# Patient Record
Sex: MALE | Race: WHITE | URBAN - METROPOLITAN AREA
[De-identification: names, ages, dates, MRNs, and addresses within clinical notes are randomized per-mention and may not be internally consistent; named-entity substitution may affect disease eponyms.]

---

## 2019-03-07 ENCOUNTER — APPOINTMENT (RX ONLY)
Dept: URBAN - METROPOLITAN AREA CLINIC 282 | Facility: CLINIC | Age: 84
Setting detail: DERMATOLOGY
End: 2019-03-07

## 2019-03-07 DIAGNOSIS — L71.8 OTHER ROSACEA: ICD-10-CM

## 2019-03-07 PROBLEM — I10 ESSENTIAL (PRIMARY) HYPERTENSION: Status: ACTIVE | Noted: 2019-03-07

## 2019-03-07 PROBLEM — I48.91 UNSPECIFIED ATRIAL FIBRILLATION: Status: ACTIVE | Noted: 2019-03-07

## 2019-03-07 PROBLEM — H91.90 UNSPECIFIED HEARING LOSS, UNSPECIFIED EAR: Status: ACTIVE | Noted: 2019-03-07

## 2019-03-07 PROCEDURE — 99212 OFFICE O/P EST SF 10 MIN: CPT

## 2019-03-07 PROCEDURE — ? PRESCRIPTION

## 2019-03-07 PROCEDURE — ? COUNSELING

## 2019-03-07 RX ORDER — MINOCYCLINE HYDROCHLORIDE 50 MG/1
CAPSULE ORAL
Qty: 30 | Refills: 11 | Status: ERX | COMMUNITY
Start: 2019-03-07

## 2019-03-07 RX ADMIN — MINOCYCLINE HYDROCHLORIDE: 50 CAPSULE ORAL at 00:00

## 2019-03-07 ASSESSMENT — LOCATION SIMPLE DESCRIPTION DERM: LOCATION SIMPLE: LEFT CHEEK

## 2019-03-07 ASSESSMENT — LOCATION DETAILED DESCRIPTION DERM: LOCATION DETAILED: LEFT MEDIAL MALAR CHEEK

## 2019-03-07 ASSESSMENT — LOCATION ZONE DERM: LOCATION ZONE: FACE

## 2022-10-04 NOTE — HPI: OTHER
[address]                 [address] [facility], [address]                          ELECTROENCEPHALOGRAM REPORT    PATIENT NAME: Estevan Macario                      :        1944  MED REC NO:   [id number]                              ROOM:       2069  ACCOUNT NO:   [de-identified]                           ADMIT DATE: 10/03/2022  PROVIDER:     Poonam Sandoval    DATE OF EEG:  10/04/2022    HISTORY:  This is a 70-year-old male with progressive memory loss and  with behavioral changes with increased activation and agitation. He  also has worsening spells of confusion. He is being evaluated for  possible seizures. MEDICATIONS:  Include galantamine, allopurinol, carvedilol,  spironolactone, and melatonin. DESCRIPTION OF THE PROCEDURE; Electrodes were applied using paste in  positions dictated by the international 10-20 system of placement. Reviewing montages included both referential and bipolar derivations. In addition to EEG data, EKG and eye movements were recorded. This is a  routine recording. This test was performed on 10/04/2022. DESCRIPTION OF ACTIVITIES:  At the onset of the study, the patient is  awake and during wakefulness, there are continuous runs of diffusely  slow background in delta frequencies at 3-4 Hz activity with  superimposed low amplitude high frequency beta activity. Blink  artifacts are noted occurring symmetrically. Throughout the study, the  patient is awake and sleep is not recorded. The low amplitude high  frequency beta activity noted occurring in bilateral anterior head  regions along with frequent muscle artifacts and these attenuated at  times. Photic stimulation did not induce posterior driving responses. Hyperventilation is not performed. Electrocardiogram montage revealed  artifacts. This study did not demonstrate any ongoing electrographic  seizure activity.   No epileptiform discharges
Condition:: Yearly follow up
Please Describe Your Condition:: Has rosacea
noted.    ELECTRODIAGNOSTIC INTERPRETATION:  This EEG performed during wakefulness  and drowsiness is abnormal with diffusely slow background in delta  frequencies compatible with underlying diffuse cortical disease, baseline  dementing illness. This study did not demonstrate any ongoing  electrographic seizure activity. No epileptiform discharges. Clinical correlation is recommended.         Georgina Mendoza    D: 10/04/2022 15:28:08       T: 10/04/2022 15:31:43     [email or web address]  Job#: [id number]     Doc#: [id number]    CC:
Prescriptions electronically submitted to pharmacy from [facility]